# Patient Record
Sex: MALE | Race: OTHER | HISPANIC OR LATINO
[De-identification: names, ages, dates, MRNs, and addresses within clinical notes are randomized per-mention and may not be internally consistent; named-entity substitution may affect disease eponyms.]

---

## 2020-02-18 ENCOUNTER — APPOINTMENT (OUTPATIENT)
Dept: UROLOGY | Facility: CLINIC | Age: 65
End: 2020-02-18
Payer: COMMERCIAL

## 2020-02-18 VITALS — DIASTOLIC BLOOD PRESSURE: 86 MMHG | HEART RATE: 64 BPM | SYSTOLIC BLOOD PRESSURE: 165 MMHG

## 2020-02-18 DIAGNOSIS — Z86.39 PERSONAL HISTORY OF OTHER ENDOCRINE, NUTRITIONAL AND METABOLIC DISEASE: ICD-10-CM

## 2020-02-18 DIAGNOSIS — Z80.0 FAMILY HISTORY OF MALIGNANT NEOPLASM OF DIGESTIVE ORGANS: ICD-10-CM

## 2020-02-18 DIAGNOSIS — Z86.79 PERSONAL HISTORY OF OTHER DISEASES OF THE CIRCULATORY SYSTEM: ICD-10-CM

## 2020-02-18 DIAGNOSIS — R36.1 HEMATOSPERMIA: ICD-10-CM

## 2020-02-18 PROBLEM — Z00.00 ENCOUNTER FOR PREVENTIVE HEALTH EXAMINATION: Status: ACTIVE | Noted: 2020-02-18

## 2020-02-18 PROCEDURE — 99204 OFFICE O/P NEW MOD 45 MIN: CPT

## 2020-02-18 RX ORDER — METOPROLOL TARTRATE 75 MG/1
TABLET, FILM COATED ORAL
Refills: 0 | Status: ACTIVE | COMMUNITY

## 2020-02-18 RX ORDER — FINASTERIDE 5 MG/1
5 TABLET, FILM COATED ORAL
Qty: 30 | Refills: 2 | Status: ACTIVE | COMMUNITY
Start: 2020-02-18 | End: 1900-01-01

## 2020-02-18 RX ORDER — METFORMIN HYDROCHLORIDE 625 MG/1
TABLET ORAL
Refills: 0 | Status: ACTIVE | COMMUNITY

## 2020-02-18 RX ORDER — ROSUVASTATIN CALCIUM 5 MG/1
TABLET, FILM COATED ORAL
Refills: 0 | Status: ACTIVE | COMMUNITY

## 2020-02-18 NOTE — HISTORY OF PRESENT ILLNESS
[FreeTextEntry1] : cc blood in semen\par 63 yo male referred for evak blood in semen\par has been present for 4 months\par  alittle slow flow no hematuria \par ps a0.4 ua neg \par no fam h/o prostate ca\par sono in  prostate 39g slightly enlarged seminal vesicles

## 2020-02-18 NOTE — PHYSICAL EXAM
[General Appearance - Well Developed] : well developed [Normal Appearance] : normal appearance [General Appearance - Well Nourished] : well nourished [Well Groomed] : well groomed [General Appearance - In No Acute Distress] : no acute distress [Edema] : no peripheral edema [Exaggerated Use Of Accessory Muscles For Inspiration] : no accessory muscle use [Respiration, Rhythm And Depth] : normal respiratory rhythm and effort [Abdomen Soft] : soft [Costovertebral Angle Tenderness] : no ~M costovertebral angle tenderness [Abdomen Tenderness] : non-tender [Urinary Bladder Findings] : the bladder was normal on palpation [Urethral Meatus] : meatus normal [Scrotum] : the scrotum was normal [No Prostate Nodules] : no prostate nodules [Testes Mass (___cm)] : there were no testicular masses [No Focal Deficits] : no focal deficits [Normal Station and Gait] : the gait and station were normal for the patient's age [] : no rash [Affect] : the affect was normal [Oriented To Time, Place, And Person] : oriented to person, place, and time [Not Anxious] : not anxious [Mood] : the mood was normal [No Palpable Adenopathy] : no palpable adenopathy

## 2020-02-18 NOTE — REVIEW OF SYSTEMS
[Heart Rate Is Fast] : fast heart rate [History of kidney stones] : history of kidney stones [see HPI] : see HPI [Wait a long time to urinate] : waits a long time to urinate [Slow urine stream] : slow urine stream [Negative] : Heme/Lymph

## 2020-02-18 NOTE — ASSESSMENT
[FreeTextEntry1] : nl exam psa and ua \par has been present for 4 months\par will start trial of finasteride\par side effects reviewed
(0) swallows foods/liquids without difficulty

## 2024-05-24 ENCOUNTER — APPOINTMENT (OUTPATIENT)
Dept: PULMONOLOGY | Facility: CLINIC | Age: 69
End: 2024-05-24
Payer: MEDICARE

## 2024-05-24 VITALS
OXYGEN SATURATION: 96 % | BODY MASS INDEX: 27.89 KG/M2 | TEMPERATURE: 97.5 F | WEIGHT: 184 LBS | HEIGHT: 68 IN | HEART RATE: 76 BPM | DIASTOLIC BLOOD PRESSURE: 64 MMHG | SYSTOLIC BLOOD PRESSURE: 119 MMHG

## 2024-05-24 DIAGNOSIS — J06.9 ACUTE UPPER RESPIRATORY INFECTION, UNSPECIFIED: ICD-10-CM

## 2024-05-24 DIAGNOSIS — R93.89 ABNORMAL FINDINGS ON DIAGNOSTIC IMAGING OF OTHER SPECIFIED BODY STRUCTURES: ICD-10-CM

## 2024-05-24 DIAGNOSIS — J30.89 OTHER ALLERGIC RHINITIS: ICD-10-CM

## 2024-05-24 PROCEDURE — 99204 OFFICE O/P NEW MOD 45 MIN: CPT

## 2024-05-24 NOTE — CONSULT LETTER
[Dear  ___] : Dear  [unfilled], [Consult Letter:] : I had the pleasure of evaluating your patient, [unfilled]. [Please see my note below.] : Please see my note below. [Consult Closing:] : Thank you very much for allowing me to participate in the care of this patient.  If you have any questions, please do not hesitate to contact me. [Sincerely,] : Sincerely, [FreeTextEntry3] : Jun Parker,  Pulmonary & Critical Care Medicine [facility]

## 2024-05-24 NOTE — ASSESSMENT
[FreeTextEntry1] : ~age~yo man lifetime never smoker with PMH HTN, HLD referred by PMD for further evaluation of abnormal CXR.  Data Reviewed: CXR PA/Lat ([facility], 4/19/24) - no interval change from prior exam; still slightly prominent R basilar interstitial markings CXR PA/Lat ([facility], 3/13/24) - clear lungs except for mildly prominent pulmonary interstitial markings at right base Medication reconciliation  Impression: #Abnormal CXR #Seasonal and environmental allergies #Recent bronchitis  Well appearing man who is no longer symptomatic of prior URI/bronchitis nor with obvious risk factors for chronic pulmonary disease. The significance of the CXR findings is not immediately clear and while I agree there may be some minor prominence of the interstitial markings, it's generally a clear film on both studies. May have reflected inflammation or residual from stated hx of infection or incidental finding. Extensively counseled pt and spouse regarding these imaging and offered going straight to CT for a closer look at right base parenchyma or longer interval rpt CXR surveillance -- they preferred the latter option.   Plan: - extensive counseling re: imaging findings as described above - repeat CXR PA/Lat in 2 month (representing 3mo surveillance) of mild R basilar interstitial prominence - based on findings and further eval, may or may not warrant full CT chest in otherwise asymptomatic, low risk patient - hold off PFTs now given lack of symptoms nor any prior hx suggesting airflow disease - for his seasonal allergies, agree trial of flonase nasal sprays to both nostrils and PRN OTC claritin vs. zyrtec would probably be of benefit - discussed w/ pt and wife - will update PMD  RTO end of July after repeat CXR

## 2024-05-24 NOTE — HISTORY OF PRESENT ILLNESS
[Never] : never [TextBox_4] : ~age~yo man lifetime never smoker with PMH HTN, HLD referred by PMD for further evaluation of abnormal CXR.  Patient with spouse today who supplements some history. Both were ill back in mid-March 2024 with what they describe as bronchitis / upper respiratory infection. Had productive cough, dyspnea, HA, general malaise and flu-like symptoms. Pt had routine PMD f/u around that time and at the visit had CXR done and wife corroborates pt receiving abx (she believes ?Amoxicillin). Both recovered from this initial infection, however were told there was small abnormality on the CXR so it was repeated by PMD 1mo later and abnormality persisted so they were instructed to see pulmonology.   Pt feels well today and in USOH. Has no acute respiratory symptoms nor does he have respiratory symptoms in general other than occasional stuffiness and rare cough related to seasonal allergies. Currently working full time without limitation. Denies any known inhalational exposures, though later says sometimes he has to go into the basement at work where there's construction going on and feels the air down there is irritating. Denies overt mold at home, but wife says they sometimes have leak in the ceiling that needs to be cleaned. No pets at home. Pt denies prior hx of pulmonary conditions including asthma, COPD, or interstitial disease. No personal or FHx of lung malignancy.   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 Méndez

## 2024-05-24 NOTE — REASON FOR VISIT
[Initial] : an initial visit [Abnormal CXR/ Chest CT] : an abnormal CXR/ chest CT [Spouse] : spouse [TextBox_13] : Dr. Daiana Méndez

## 2024-07-24 ENCOUNTER — APPOINTMENT (OUTPATIENT)
Dept: PULMONOLOGY | Facility: CLINIC | Age: 69
End: 2024-07-24

## 2024-09-04 ENCOUNTER — APPOINTMENT (OUTPATIENT)
Dept: PULMONOLOGY | Facility: CLINIC | Age: 69
End: 2024-09-04
Payer: MEDICARE

## 2024-09-04 VITALS
HEART RATE: 71 BPM | DIASTOLIC BLOOD PRESSURE: 78 MMHG | SYSTOLIC BLOOD PRESSURE: 130 MMHG | RESPIRATION RATE: 16 BRPM | HEIGHT: 68 IN | WEIGHT: 185 LBS | BODY MASS INDEX: 28.04 KG/M2 | OXYGEN SATURATION: 98 % | TEMPERATURE: 98.3 F

## 2024-09-04 DIAGNOSIS — R93.89 ABNORMAL FINDINGS ON DIAGNOSTIC IMAGING OF OTHER SPECIFIED BODY STRUCTURES: ICD-10-CM

## 2024-09-04 DIAGNOSIS — J30.89 OTHER ALLERGIC RHINITIS: ICD-10-CM

## 2024-09-04 PROCEDURE — 99214 OFFICE O/P EST MOD 30 MIN: CPT

## 2024-09-06 ENCOUNTER — APPOINTMENT (OUTPATIENT)
Dept: RADIOLOGY | Facility: CLINIC | Age: 69
End: 2024-09-06
Payer: MEDICARE

## 2024-09-06 PROCEDURE — 71046 X-RAY EXAM CHEST 2 VIEWS: CPT

## 2024-09-06 NOTE — ASSESSMENT
[FreeTextEntry1] : ~age~yo man lifetime never smoker with PMH HTN, HLD referred by PMD for further evaluation of abnormal CXR, less likely significant pulmonary pathology clinically; following up for surveillance CXR.   Data Reviewed: CXR PA/Lat ([facility], 9/6/24) - clear lung fields b/l, no acute pathology  Impression: #Abnormal CXR #Seasonal and environmental allergies  Doing well overall from pulmonary standpoint. Needs surveillance CXR to ensure resolution/stability today.   Plan: - repeat CXR today as above, clear - for his seasonal allergies, can cont trial of flonase nasal sprays to both nostrils and PRN OTC claritin vs. zyrtec when symptomsa re problematic; currently well controlled without additional interventions compared to earlier in the season - will update PMD  RTO PRN

## 2024-09-06 NOTE — CONSULT LETTER
[Dear  ___] : Dear  [unfilled], [Consult Letter:] : I had the pleasure of evaluating your patient, [unfilled]. [Please see my note below.] : Please see my note below. [Consult Closing:] : Thank you very much for allowing me to participate in the care of this patient.  If you have any questions, please do not hesitate to contact me. [Sincerely,] : Sincerely, [Courtesy Letter:] : I had the pleasure of seeing your patient, [unfilled], in my office today. [FreeTextEntry3] : Jun Parker, DO Pulmonary & Critical Care Medicine [facility] 2 seconds or less

## 2024-09-06 NOTE — CONSULT LETTER
[Dear  ___] : Dear  [unfilled], [Consult Letter:] : I had the pleasure of evaluating your patient, [unfilled]. [Please see my note below.] : Please see my note below. [Consult Closing:] : Thank you very much for allowing me to participate in the care of this patient.  If you have any questions, please do not hesitate to contact me. [Sincerely,] : Sincerely, [Courtesy Letter:] : I had the pleasure of seeing your patient, [unfilled], in my office today. [FreeTextEntry3] : Jun Parker, DO Pulmonary & Critical Care Medicine [facility]

## 2024-09-06 NOTE — HISTORY OF PRESENT ILLNESS
[Never] : never [TextBox_4] : ~age~yo man lifetime never smoker with PMH HTN, HLD referred by PMD for further evaluation of abnormal CXR.  Patient with spouse today who supplements some history. Both were ill back in mid-March 2024 with what they describe as bronchitis / upper respiratory infection. Had productive cough, dyspnea, HA, general malaise and flu-like symptoms. Pt had routine PMD f/u around that time and at the visit had CXR done and wife corroborates pt receiving abx (she believes ?Amoxicillin). Both recovered from this initial infection, however were told there was small abnormality on the CXR so it was repeated by PMD 1mo later and abnormality persisted so they were instructed to see pulmonology.   Pt feels well today and in USOH. Has no acute respiratory symptoms nor does he have respiratory symptoms in general other than occasional stuffiness and rare cough related to seasonal allergies. Currently working full time without limitation. Denies any known inhalational exposures, though later says sometimes he has to go into the basement at work where there's construction going on and feels the air down there is irritating. Denies overt mold at home, but wife says they sometimes have leak in the ceiling that needs to be cleaned. No pets at home. Pt denies prior hx of pulmonary conditions including asthma, COPD, or interstitial disease. No personal or FHx of lung malignancy.   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 Méndez  [9/4/24] Here for follow-up. Accompanied by spouse. Did not get 3mo surveillance CXR as recommended at initial visit. Saw PMD recently who inquired about this and was reminded to f/u with me. Today feels well without any complaints. No respiratory sx. No recent illnesses. No respiratory limitations to exercise or ADLs. Willing to proceed with repeat CXR today. Allergy sx are well controlled and hasn't needed nasal sprays recently like he did earlier in the season.

## 2024-09-06 NOTE — HISTORY OF PRESENT ILLNESS
[Never] : never [TextBox_4] : ~age~yo man lifetime never smoker with PMH HTN, HLD referred by PMD for further evaluation of abnormal CXR.  Patient with spouse today who supplements some history. Both were ill back in mid-March 2024 with what they describe as bronchitis / upper respiratory infection. Had productive cough, dyspnea, HA, general malaise and flu-like symptoms. Pt had routine PMD f/u around that time and at the visit had CXR done and wife corroborates pt receiving abx (she believes ?Amoxicillin). Both recovered from this initial infection, however were told there was small abnormality on the CXR so it was repeated by PMD 1mo later and abnormality persisted so they were instructed to see pulmonology.   Pt feels well today and in USOH. Has no acute respiratory symptoms nor does he have respiratory symptoms in general other than occasional stuffiness and rare cough related to seasonal allergies. Currently working full time without limitation. Denies any known inhalational exposures, though later says sometimes he has to go into the basement at work where there's construction going on and feels the air down there is irritating. Denies overt mold at home, but wife says they sometimes have leak in the ceiling that needs to be cleaned. No pets at home. Pt denies prior hx of pulmonary conditions including asthma, COPD, or interstitial disease. No personal or FHx of lung malignancy.   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 Méndez  [9/4/24] Here for follow-up. Accompanied by spouse. Did not get 3mo surveillance CXR as recommended at initial visit. Saw PMD recently who inquired about this and was reminded to f/u with me. Today feels well without any complaints. No respiratory sx. No recent illnesses. No respiratory limitations to exercise or ADLs. Willing to proceed with repeat CXR today. Allergy sx are well controlled and hasn't needed nasal sprays recently like he did earlier in the season.

## 2024-09-06 NOTE — REVIEW OF SYSTEMS
[Seasonal Allergies] : seasonal allergies [Negative] : Endocrine [TextBox_57] : improving allergy sx

## 2024-09-06 NOTE — REASON FOR VISIT
[Follow-Up] : a follow-up visit [Abnormal CXR/ Chest CT] : an abnormal CXR/ chest CT [Spouse] : spouse [TextBox_13] : Dr. Daiana Méndez

## 2024-09-06 NOTE — HISTORY OF PRESENT ILLNESS
[Never] : never [TextBox_4] : ~age~yo man lifetime never smoker with PMH HTN, HLD referred by PMD for further evaluation of abnormal CXR.  Patient with spouse today who supplements some history. Both were ill back in mid-March 2024 with what they describe as bronchitis / upper respiratory infection. Had productive cough, dyspnea, HA, general malaise and flu-like symptoms. Pt had routine PMD f/u around that time and at the visit had CXR done and wife corroborates pt receiving abx (she believes ?Amoxicillin). Both recovered from this initial infection, however were told there was small abnormality on the CXR so it was repeated by PMD 1mo later and abnormality persisted so they were instructed to see pulmonology.   Pt feels well today and in USOH. Has no acute respiratory symptoms nor does he have respiratory symptoms in general other than occasional stuffiness and rare cough related to seasonal allergies. Currently working full time without limitation. Denies any known inhalational exposures, though later says sometimes he has to go into the basement at work where there's construction going on and feels the air down there is irritating. Denies overt mold at home, but wife says they sometimes have leak in the ceiling that needs to be cleaned. No pets at home. Pt denies prior hx of pulmonary conditions including asthma, COPD, or interstitial disease. No personal or FHx of lung malignancy.   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 Méndez  [9/4/24] Here for follow-up. Accompanied by spouse. Did not get 3mo surveillance CXR as recommended at initial visit. Saw PMD recently who inquired about this and was reminded to f/u with me. Today feels well without any complaints. No respiratory sx. No recent illnesses. No respiratory limitations to exercise or ADLs. Willing to proceed with repeat CXR today. Allergy sx are well controlled and hasn't needed nasal sprays recently like he did earlier in the season.

## 2024-09-06 NOTE — DISCUSSION/SUMMARY
[FreeTextEntry1] : CXR PA/Lat ([facility], 4/19/24) - no interval change from prior exam; still slightly prominent R basilar interstitial markings  CXR PA/Lat ([facility], 3/13/24) - clear lungs except for mildly prominent pulmonary interstitial markings at right base

## 2024-09-06 NOTE — CONSULT LETTER
[Dear  ___] : Dear  [unfilled], [Consult Letter:] : I had the pleasure of evaluating your patient, [unfilled]. [Please see my note below.] : Please see my note below. [Consult Closing:] : Thank you very much for allowing me to participate in the care of this patient.  If you have any questions, please do not hesitate to contact me. [Sincerely,] : Sincerely, [Courtesy Letter:] : I had the pleasure of seeing your patient, [unfilled], in my office today. [FreeTextEntry3] : Jun Parker, DO Pulmonary & Critical Care Medicine [facility]